# Patient Record
Sex: MALE | Race: WHITE | NOT HISPANIC OR LATINO
[De-identification: names, ages, dates, MRNs, and addresses within clinical notes are randomized per-mention and may not be internally consistent; named-entity substitution may affect disease eponyms.]

---

## 2021-02-11 ENCOUNTER — APPOINTMENT (OUTPATIENT)
Dept: SURGERY | Facility: CLINIC | Age: 61
End: 2021-02-11
Payer: COMMERCIAL

## 2021-02-11 ENCOUNTER — LABORATORY RESULT (OUTPATIENT)
Age: 61
End: 2021-02-11

## 2021-02-11 VITALS
HEART RATE: 60 BPM | WEIGHT: 214 LBS | BODY MASS INDEX: 27.46 KG/M2 | DIASTOLIC BLOOD PRESSURE: 78 MMHG | SYSTOLIC BLOOD PRESSURE: 152 MMHG | HEIGHT: 74 IN

## 2021-02-11 DIAGNOSIS — Z78.9 OTHER SPECIFIED HEALTH STATUS: ICD-10-CM

## 2021-02-11 DIAGNOSIS — Z86.39 PERSONAL HISTORY OF OTHER ENDOCRINE, NUTRITIONAL AND METABOLIC DISEASE: ICD-10-CM

## 2021-02-11 DIAGNOSIS — Z87.891 PERSONAL HISTORY OF NICOTINE DEPENDENCE: ICD-10-CM

## 2021-02-11 DIAGNOSIS — Z86.79 PERSONAL HISTORY OF OTHER DISEASES OF THE CIRCULATORY SYSTEM: ICD-10-CM

## 2021-02-11 PROCEDURE — 99072 ADDL SUPL MATRL&STAF TM PHE: CPT

## 2021-02-11 PROCEDURE — 10160 PNXR ASPIR ABSC HMTMA BULLA: CPT

## 2021-02-11 PROCEDURE — 99203 OFFICE O/P NEW LOW 30 MIN: CPT | Mod: 25

## 2021-02-11 RX ORDER — TRICON 15; 110; 75; .5 UG/1; MG/1; MG/1; MG/1
CAPSULE ORAL
Refills: 0 | Status: ACTIVE | COMMUNITY

## 2021-02-11 RX ORDER — METHYLPHENIDATE HYDROCHLORIDE 5 MG/1
TABLET ORAL
Refills: 0 | Status: ACTIVE | COMMUNITY

## 2021-02-11 RX ORDER — ATORVASTATIN CALCIUM 80 MG/1
TABLET, FILM COATED ORAL
Refills: 0 | Status: ACTIVE | COMMUNITY

## 2021-02-11 RX ORDER — LORAZEPAM 2 MG/1
TABLET ORAL
Refills: 0 | Status: ACTIVE | COMMUNITY

## 2021-02-14 NOTE — PROCEDURE
[FreeTextEntry1] : abscess aspiration [FreeTextEntry2] : inflamed mid back mass 5 cm [FreeTextEntry3] : Patient for abscess aspiration. Risks benefits and alternatives discussed including bleeding, infection, swelling and need for repeat biopsy. Questions answered.\par Consent obtained, timeout taken.\par \par Patient seated.\par No anesthetic used. \par Nodule localized by palpation \par Sterile technique with Betadine skin prep.\par 18-gauge needle with a single pass.\par Purulent fluid aspirated, culture sent\par \par Post procedure:\par Hemostasis obtained, patient tolerated well, no complications.\par Post procedure instructions given.\par

## 2021-02-16 NOTE — ASSESSMENT
[FreeTextEntry1] : Patient with sebaceous cysts of the back with recent inflammation. I performed aspiration of the lower mass for culture and started him on antibiotics. I will reevaluate him in 2 weeks to plan definitive surgical excision. The patient was instructed to contact my office if his symptoms worsen. I recommended warm compresses.

## 2021-02-16 NOTE — PHYSICAL EXAM
[Normal] : no neck adenopathy [de-identified] : no cervical or supraclavicular adenopathy, trachea midline, thyroid without enlargement or palpable mass [de-identified] :  two mid upper back massess, superior ~4 cm without erythema, lower 5 cm with erythema and fluctuance,  Skin:  normal appearance.  no rash, nodules, vesicles, or erythema,\par Musculoskeletal:  full range of motion and no deformities appreciated\par Neurological:  grossly intact\par Psychiatric:  oriented to person, place and time with appropriate affect

## 2021-02-16 NOTE — REASON FOR VISIT
[Initial Consultation] : an initial consultation for [Procedure: _________] : a [unfilled] procedure visit [Other: _____] : [unfilled] [FreeTextEntry2] : back masses

## 2021-02-16 NOTE — HISTORY OF PRESENT ILLNESS
[de-identified] : Patient referred by Dr. Roberts for evaluation of enlarging, increasingly symptomatic posterior back masses.  Patient reports a long history of back masses, 2 weeks ago, lower mass increased in size and is now painful. Denies prior drainage.

## 2021-02-16 NOTE — HISTORY OF PRESENT ILLNESS
[de-identified] : Patient referred by Dr. Roberts for evaluation of enlarging, increasingly symptomatic posterior back masses.  Patient reports a long history of back masses, 2 weeks ago, lower mass increased in size and is now painful. Denies prior drainage.

## 2021-02-16 NOTE — PHYSICAL EXAM
[Normal] : no neck adenopathy [de-identified] : no cervical or supraclavicular adenopathy, trachea midline, thyroid without enlargement or palpable mass [de-identified] :  two mid upper back massess, superior ~4 cm without erythema, lower 5 cm with erythema and fluctuance,  Skin:  normal appearance.  no rash, nodules, vesicles, or erythema,\par Musculoskeletal:  full range of motion and no deformities appreciated\par Neurological:  grossly intact\par Psychiatric:  oriented to person, place and time with appropriate affect

## 2021-02-16 NOTE — CONSULT LETTER
[Dear  ___] : Dear  [unfilled], [Consult Letter:] : I had the pleasure of evaluating your patient, [unfilled]. [Please see my note below.] : Please see my note below. [Consult Closing:] : Thank you very much for allowing me to participate in the care of this patient.  If you have any questions, please do not hesitate to contact me. [DrRolf  ___] : Dr. RICK [FreeTextEntry2] : Dr. Ambar Roberts [FreeTextEntry3] : Sincerely yours,\par \par Sophia Hutchison MD, FACS\par Assistant Professor of Surgery\par [facility]

## 2021-02-16 NOTE — CONSULT LETTER
[Dear  ___] : Dear  [unfilled], [Consult Letter:] : I had the pleasure of evaluating your patient, [unfilled]. [Please see my note below.] : Please see my note below. [Consult Closing:] : Thank you very much for allowing me to participate in the care of this patient.  If you have any questions, please do not hesitate to contact me. [DrRolf  ___] : Dr. RICK [FreeTextEntry2] : Dr. Ambar Roberts [FreeTextEntry3] : Sincerely yours,\par \par Sophia Hutchison MD, FACS\par Assistant Professor of Surgery\par [facility]

## 2021-02-25 ENCOUNTER — APPOINTMENT (OUTPATIENT)
Dept: SURGERY | Facility: CLINIC | Age: 61
End: 2021-02-25
Payer: COMMERCIAL

## 2021-02-25 PROCEDURE — 99072 ADDL SUPL MATRL&STAF TM PHE: CPT

## 2021-02-25 PROCEDURE — 99213 OFFICE O/P EST LOW 20 MIN: CPT

## 2021-02-25 NOTE — PHYSICAL EXAM
[de-identified] : no cervical or supraclavicular adenopathy, trachea midline, thyroid without enlargement or palpable mass [Normal] : no neck adenopathy [de-identified] :  two mid upper back masses, superior ~4 cm without erythema, lower 3 cm with significant improvement.   Skin:  normal appearance.  no rash, nodules, vesicles, or erythema,\par Musculoskeletal:  full range of motion and no deformities appreciated\par Neurological:  grossly intact\par Psychiatric:  oriented to person, place and time with appropriate affect

## 2021-02-25 NOTE — ASSESSMENT
[FreeTextEntry1] : Patient with sebaceous cysts of the back with recent inflammation.  keflex,  will schedule procedure. risks reviewed questions answered.

## 2021-02-25 NOTE — HISTORY OF PRESENT ILLNESS
[de-identified] : Patient referred by Dr. Roberts for evaluation of enlarging, increasingly symptomatic posterior back masses.  Patient reports a long history of back masses, 2 weeks ago, lower mass increased in size and is now painful. Denies prior drainage.  completed antibiotic with decrease in lower previously infected lesion, reports some continued drainage.

## 2021-03-16 ENCOUNTER — OUTPATIENT (OUTPATIENT)
Dept: OUTPATIENT SERVICES | Facility: HOSPITAL | Age: 61
LOS: 1 days | End: 2021-03-16
Payer: COMMERCIAL

## 2021-03-16 VITALS
WEIGHT: 212.08 LBS | HEIGHT: 71.5 IN | RESPIRATION RATE: 20 BRPM | HEART RATE: 58 BPM | TEMPERATURE: 97 F | SYSTOLIC BLOOD PRESSURE: 130 MMHG | OXYGEN SATURATION: 98 % | DIASTOLIC BLOOD PRESSURE: 70 MMHG

## 2021-03-16 DIAGNOSIS — Z98.890 OTHER SPECIFIED POSTPROCEDURAL STATES: Chronic | ICD-10-CM

## 2021-03-16 DIAGNOSIS — L72.3 SEBACEOUS CYST: ICD-10-CM

## 2021-03-16 DIAGNOSIS — Z01.812 ENCOUNTER FOR PREPROCEDURAL LABORATORY EXAMINATION: ICD-10-CM

## 2021-03-16 DIAGNOSIS — I73.9 PERIPHERAL VASCULAR DISEASE, UNSPECIFIED: ICD-10-CM

## 2021-03-16 LAB
ANION GAP SERPL CALC-SCNC: 13 MMOL/L — SIGNIFICANT CHANGE UP (ref 7–14)
BUN SERPL-MCNC: 17 MG/DL — SIGNIFICANT CHANGE UP (ref 7–23)
CALCIUM SERPL-MCNC: 10.2 MG/DL — SIGNIFICANT CHANGE UP (ref 8.4–10.5)
CHLORIDE SERPL-SCNC: 103 MMOL/L — SIGNIFICANT CHANGE UP (ref 98–107)
CO2 SERPL-SCNC: 23 MMOL/L — SIGNIFICANT CHANGE UP (ref 22–31)
CREAT SERPL-MCNC: 0.61 MG/DL — SIGNIFICANT CHANGE UP (ref 0.5–1.3)
GLUCOSE SERPL-MCNC: 108 MG/DL — HIGH (ref 70–99)
HCT VFR BLD CALC: 43.1 % — SIGNIFICANT CHANGE UP (ref 39–50)
HGB BLD-MCNC: 14.5 G/DL — SIGNIFICANT CHANGE UP (ref 13–17)
MCHC RBC-ENTMCNC: 31.7 PG — SIGNIFICANT CHANGE UP (ref 27–34)
MCHC RBC-ENTMCNC: 33.6 GM/DL — SIGNIFICANT CHANGE UP (ref 32–36)
MCV RBC AUTO: 94.1 FL — SIGNIFICANT CHANGE UP (ref 80–100)
NRBC # BLD: 0 /100 WBCS — SIGNIFICANT CHANGE UP
NRBC # FLD: 0 K/UL — SIGNIFICANT CHANGE UP
PLATELET # BLD AUTO: 185 K/UL — SIGNIFICANT CHANGE UP (ref 150–400)
POTASSIUM SERPL-MCNC: 4 MMOL/L — SIGNIFICANT CHANGE UP (ref 3.5–5.3)
POTASSIUM SERPL-SCNC: 4 MMOL/L — SIGNIFICANT CHANGE UP (ref 3.5–5.3)
RBC # BLD: 4.58 M/UL — SIGNIFICANT CHANGE UP (ref 4.2–5.8)
RBC # FLD: 13 % — SIGNIFICANT CHANGE UP (ref 10.3–14.5)
SODIUM SERPL-SCNC: 139 MMOL/L — SIGNIFICANT CHANGE UP (ref 135–145)
WBC # BLD: 7.15 K/UL — SIGNIFICANT CHANGE UP (ref 3.8–10.5)
WBC # FLD AUTO: 7.15 K/UL — SIGNIFICANT CHANGE UP (ref 3.8–10.5)

## 2021-03-16 PROCEDURE — 93010 ELECTROCARDIOGRAM REPORT: CPT

## 2021-03-16 RX ORDER — SODIUM CHLORIDE 9 MG/ML
1000 INJECTION, SOLUTION INTRAVENOUS
Refills: 0 | Status: DISCONTINUED | OUTPATIENT
Start: 2021-03-26 | End: 2021-03-31

## 2021-03-16 NOTE — H&P PST ADULT - NSICDXPASTMEDICALHX_GEN_ALL_CORE_FT
PAST MEDICAL HISTORY:  Attention deficit disorder (ADD)     Dyslipidemia     H/O carotid stenosis     PVD (peripheral vascular disease)     Sebaceous cyst

## 2021-03-16 NOTE — H&P PST ADULT - NSANTHOSAYNRD_GEN_A_CORE
No. SAMI screening performed.  STOP BANG Legend: 0-2 = LOW Risk; 3-4 = INTERMEDIATE Risk; 5-8 = HIGH Risk

## 2021-03-16 NOTE — H&P PST ADULT - NSICDXPROBLEM_GEN_ALL_CORE_FT
PROBLEM DIAGNOSES  Problem: PVD (peripheral vascular disease)  Assessment and Plan: Pt on plavix, instructed to stop 5 days prior to surgery and to concirm instructions with PCP - Dr Vázquez.  Pt verbalized understanding    Problem: Sebaceous cyst  Assessment and Plan: Scheduled for excision back mass X 2 on 3/26/2021  Written & verbal preop instructions, gi prophylaxis & surgical soap given  Pt verbalized good understanding.  Teach back done on surgical soap instructions.      Problem: Encounter for preprocedure screening laboratory testing for COVID-19  Assessment and Plan: per pt scheduled within 72 hrs of this surgery        PROBLEM DIAGNOSES  Problem: PVD (peripheral vascular disease)  Assessment and Plan: Pt on plavix, instructed to stop 5 days prior to surgery and to concirm instructions with PCP - Dr Vázquez.  Pending most recent echo/stress test & comparison EKG  Pt verbalized understanding    Problem: Sebaceous cyst  Assessment and Plan: Scheduled for excision back mass X 2 on 3/26/2021  Written & verbal preop instructions, gi prophylaxis & surgical soap given  Pt verbalized good understanding.  Teach back done on surgical soap instructions.  SAMI precautions recommended.  OR booking notified via fax.      Problem: Encounter for preprocedure screening laboratory testing for COVID-19  Assessment and Plan: per pt scheduled within 72 hrs of this surgery

## 2021-03-16 NOTE — H&P PST ADULT - HISTORY OF PRESENT ILLNESS
59y/o male presents for preop eval for scheduled excision of back mass X2.  Pt states had cysts for several years but one was recently infected.

## 2021-03-23 ENCOUNTER — APPOINTMENT (OUTPATIENT)
Dept: DISASTER EMERGENCY | Facility: CLINIC | Age: 61
End: 2021-03-23

## 2021-03-24 LAB — SARS-COV-2 N GENE NPH QL NAA+PROBE: NOT DETECTED

## 2021-03-25 ENCOUNTER — TRANSCRIPTION ENCOUNTER (OUTPATIENT)
Age: 61
End: 2021-03-25

## 2021-03-25 NOTE — ASU PATIENT PROFILE, ADULT - PMH
Attention deficit disorder (ADD)    Dyslipidemia    H/O carotid stenosis    PVD (peripheral vascular disease)    Sebaceous cyst

## 2021-03-26 ENCOUNTER — APPOINTMENT (OUTPATIENT)
Dept: SURGERY | Facility: HOSPITAL | Age: 61
End: 2021-03-26

## 2021-03-26 ENCOUNTER — OUTPATIENT (OUTPATIENT)
Dept: OUTPATIENT SERVICES | Facility: HOSPITAL | Age: 61
LOS: 1 days | Discharge: HOME | End: 2021-03-26
Payer: COMMERCIAL

## 2021-03-26 ENCOUNTER — RESULT REVIEW (OUTPATIENT)
Age: 61
End: 2021-03-26

## 2021-03-26 VITALS
HEIGHT: 71.5 IN | WEIGHT: 212.08 LBS | TEMPERATURE: 98 F | DIASTOLIC BLOOD PRESSURE: 71 MMHG | RESPIRATION RATE: 16 BRPM | SYSTOLIC BLOOD PRESSURE: 151 MMHG | OXYGEN SATURATION: 96 % | HEART RATE: 54 BPM

## 2021-03-26 VITALS
SYSTOLIC BLOOD PRESSURE: 128 MMHG | DIASTOLIC BLOOD PRESSURE: 88 MMHG | OXYGEN SATURATION: 98 % | RESPIRATION RATE: 16 BRPM | HEART RATE: 70 BPM

## 2021-03-26 DIAGNOSIS — L72.3 SEBACEOUS CYST: ICD-10-CM

## 2021-03-26 DIAGNOSIS — Z98.890 OTHER SPECIFIED POSTPROCEDURAL STATES: Chronic | ICD-10-CM

## 2021-03-26 PROCEDURE — 11403 EXC TR-EXT B9+MARG 2.1-3CM: CPT | Mod: 59

## 2021-03-26 PROCEDURE — 88305 TISSUE EXAM BY PATHOLOGIST: CPT | Mod: 26

## 2021-03-26 PROCEDURE — 11404 EXC TR-EXT B9+MARG 3.1-4 CM: CPT

## 2021-03-26 RX ORDER — OXYCODONE HYDROCHLORIDE 5 MG/1
5 TABLET ORAL ONCE
Refills: 0 | Status: DISCONTINUED | OUTPATIENT
Start: 2021-03-26 | End: 2021-03-26

## 2021-03-26 RX ORDER — ERGOCALCIFEROL 1.25 MG/1
1 CAPSULE ORAL
Qty: 0 | Refills: 0 | DISCHARGE

## 2021-03-26 RX ORDER — ACETAMINOPHEN 500 MG
650 TABLET ORAL EVERY 6 HOURS
Refills: 0 | Status: DISCONTINUED | OUTPATIENT
Start: 2021-03-26 | End: 2021-04-09

## 2021-03-26 RX ORDER — CLOPIDOGREL BISULFATE 75 MG/1
1 TABLET, FILM COATED ORAL
Qty: 0 | Refills: 0 | DISCHARGE

## 2021-03-26 RX ORDER — ONDANSETRON 8 MG/1
4 TABLET, FILM COATED ORAL ONCE
Refills: 0 | Status: DISCONTINUED | OUTPATIENT
Start: 2021-03-26 | End: 2021-04-09

## 2021-03-26 RX ORDER — FENTANYL CITRATE 50 UG/ML
25 INJECTION INTRAVENOUS
Refills: 0 | Status: DISCONTINUED | OUTPATIENT
Start: 2021-03-26 | End: 2021-03-26

## 2021-03-26 RX ORDER — FOLIC ACID 0.8 MG
1 TABLET ORAL
Qty: 0 | Refills: 0 | DISCHARGE

## 2021-03-26 RX ORDER — FENOFIBRATE,MICRONIZED 130 MG
1 CAPSULE ORAL
Qty: 0 | Refills: 0 | DISCHARGE

## 2021-03-26 RX ORDER — METHYLPHENIDATE HCL 5 MG
1 TABLET ORAL
Qty: 0 | Refills: 0 | DISCHARGE

## 2021-03-26 RX ORDER — ATORVASTATIN CALCIUM 80 MG/1
1 TABLET, FILM COATED ORAL
Qty: 0 | Refills: 0 | DISCHARGE

## 2021-03-26 RX ORDER — ACETAMINOPHEN 500 MG
2 TABLET ORAL
Qty: 0 | Refills: 0 | DISCHARGE
Start: 2021-03-26

## 2021-03-26 NOTE — ASU DISCHARGE PLAN (ADULT/PEDIATRIC) - ASU DC SPECIAL INSTRUCTIONSFT
Keep dressings dry for 48 hours, then may shower with steri strips.   Avoid NSAIDs (Advil, Motrin, Ibuprofen) for 48 hours.   Take OTC Tylenol 325mg, 2 tabs every 6 hours as needed for pain.   *Follow up with Dr. Hutchison on (4/6/21), please call the office for an appointment.

## 2021-03-26 NOTE — ASU DISCHARGE PLAN (ADULT/PEDIATRIC) - CARE PROVIDER_API CALL
Sophia Hutchison (MD)  Surgery  [address], [address]  [place] [address]  Phone: [phone]  Fax: [phone]  Scheduled Appointment: 04/06/2021

## 2021-03-29 PROBLEM — F98.8 OTHER SPECIFIED BEHAVIORAL AND EMOTIONAL DISORDERS WITH ONSET USUALLY OCCURRING IN CHILDHOOD AND ADOLESCENCE: Chronic | Status: ACTIVE | Noted: 2021-03-16

## 2021-03-29 PROBLEM — L72.3 SEBACEOUS CYST: Chronic | Status: ACTIVE | Noted: 2021-03-16

## 2021-03-29 PROBLEM — E78.5 HYPERLIPIDEMIA, UNSPECIFIED: Chronic | Status: ACTIVE | Noted: 2021-03-16

## 2021-03-29 PROBLEM — Z86.79 PERSONAL HISTORY OF OTHER DISEASES OF THE CIRCULATORY SYSTEM: Chronic | Status: ACTIVE | Noted: 2021-03-16

## 2021-03-29 PROBLEM — I73.9 PERIPHERAL VASCULAR DISEASE, UNSPECIFIED: Chronic | Status: ACTIVE | Noted: 2021-03-16

## 2021-04-01 LAB — SURGICAL PATHOLOGY STUDY: SIGNIFICANT CHANGE UP

## 2021-04-08 ENCOUNTER — APPOINTMENT (OUTPATIENT)
Dept: SURGERY | Facility: CLINIC | Age: 61
End: 2021-04-08
Payer: COMMERCIAL

## 2021-04-08 PROCEDURE — 99072 ADDL SUPL MATRL&STAF TM PHE: CPT

## 2021-04-08 PROCEDURE — 99212 OFFICE O/P EST SF 10 MIN: CPT

## 2021-04-08 NOTE — HISTORY OF PRESENT ILLNESS
[de-identified] : Patient referred by Dr. Roberts for evaluation of enlarging, increasingly symptomatic posterior back masses.  Patient reports a long history of back masses, 2 weeks ago, lower mass increased in size and is now painful. Denies prior drainage.  completed antibiotic with decrease in lower previously infected lesion, reports some continued drainage.  \par 3/26/21 excision back masses x 2.  path benign, denies pain or drainage.

## 2021-04-08 NOTE — PHYSICAL EXAM
[de-identified] : no cervical or supraclavicular adenopathy, trachea midline, thyroid without enlargement or palpable mass [Normal] : no neck adenopathy [de-identified] :  two  back incisions healing. scar min discussed. .   Skin:  normal appearance.  no rash, nodules, vesicles, or erythema,\par Musculoskeletal:  full range of motion and no deformities appreciated\par Neurological:  grossly intact\par Psychiatric:  oriented to person, place and time with appropriate affect

## 2021-04-08 NOTE — ASSESSMENT
[FreeTextEntry1] : Patient with sebaceous cysts of the back with recent inflammation. Doing well postop  sutures removed. . rto 4 mo.

## 2021-04-14 ENCOUNTER — TRANSCRIPTION ENCOUNTER (OUTPATIENT)
Age: 61
End: 2021-04-14

## 2021-04-15 ENCOUNTER — APPOINTMENT (OUTPATIENT)
Dept: SURGERY | Facility: CLINIC | Age: 61
End: 2021-04-15
Payer: COMMERCIAL

## 2021-04-15 DIAGNOSIS — Z01.818 ENCOUNTER FOR OTHER PREPROCEDURAL EXAMINATION: ICD-10-CM

## 2021-04-15 DIAGNOSIS — T81.30XA DISRUPTION OF WOUND, UNSPECIFIED, INITIAL ENCOUNTER: ICD-10-CM

## 2021-04-15 PROCEDURE — 99072 ADDL SUPL MATRL&STAF TM PHE: CPT

## 2021-04-15 PROCEDURE — 99212 OFFICE O/P EST SF 10 MIN: CPT

## 2021-04-15 NOTE — HISTORY OF PRESENT ILLNESS
[de-identified] : Patient referred by Dr. Roberts for evaluation of enlarging, increasingly symptomatic posterior back masses.  Patient reports a long history of back masses, 2 weeks ago, lower mass increased in size and is now painful. Denies prior drainage.  completed antibiotic with decrease in lower previously infected lesion, reports some continued drainage.  \par 3/26/21 excision back masses x 2.  path benign, denies pain or drainage.  I spoke to the patient yesterday and reported drainage and bleeding from upper back wound. Patient was seen in urgent care center, diagnosed with mild dehiscence of the wound. Started on Bactrim. Denies additional bloody drainage. Patient has stopped his Plavix in preparation for dental procedure. Denies fever, chills, sweats, or other symptoms.

## 2021-04-15 NOTE — PHYSICAL EXAM
[de-identified] : no cervical or supraclavicular adenopathy, trachea midline, thyroid without enlargement or palpable mass [Normal] : no neck adenopathy [de-identified] :  upper  back incisions with area of dehiscence.  minimal erythema,  scar min discussed. .   Skin:  normal appearance.  no rash, nodules, vesicles, or erythema,\par Musculoskeletal:  full range of motion and no deformities appreciated\par Neurological:  grossly intact\par Psychiatric:  oriented to person, place and time with appropriate affect

## 2021-04-15 NOTE — ASSESSMENT
[FreeTextEntry1] : Patient with sebaceous cysts of the back with slight dehiscence, will complet antibiotics. RTO 2 weeks.

## 2021-04-27 ENCOUNTER — APPOINTMENT (OUTPATIENT)
Dept: SURGERY | Facility: CLINIC | Age: 61
End: 2021-04-27
Payer: COMMERCIAL

## 2021-04-27 DIAGNOSIS — R22.2 LOCALIZED SWELLING, MASS AND LUMP, TRUNK: ICD-10-CM

## 2021-04-27 PROCEDURE — 99072 ADDL SUPL MATRL&STAF TM PHE: CPT

## 2021-04-27 PROCEDURE — 99212 OFFICE O/P EST SF 10 MIN: CPT

## 2021-04-27 NOTE — PHYSICAL EXAM
[de-identified] : no cervical or supraclavicular adenopathy, trachea midline, thyroid without enlargement or palpable mass [Normal] : no neck adenopathy [de-identified] :  upper  back incisions with area of dehiscence decreasing. no  erythema,  scar min discussed. .   Skin:  normal appearance.  no rash, nodules, vesicles, or erythema,\par Musculoskeletal:  full range of motion and no deformities appreciated\par Neurological:  grossly intact\par Psychiatric:  oriented to person, place and time with appropriate affect

## 2021-04-27 NOTE — ASSESSMENT
[FreeTextEntry1] : Patient with sebaceous cysts of the back with slight dehiscence, continue wound care  RTO 4 mo

## 2021-04-27 NOTE — HISTORY OF PRESENT ILLNESS
Patient called the after hour line because he is nauseated. He states he is keeping food down but sudden movements makes him nauseated and he has had dry heaves. He currently denies any vertigo, he has had that before with a BP medication. He denies any fever and his bowels are moving well. I ordered zofran #20 which he states he has taken before and it has worked well for him in the past. His pharmacy is [facility] [address].
[de-identified] : Patient referred by Dr. Roberts for evaluation of enlarging, increasingly symptomatic posterior back masses.  Patient reports a long history of back masses, 2 weeks ago, lower mass increased in size and is now painful. Denies prior drainage.  completed antibiotic with decrease in lower previously infected lesion, reports some continued drainage.  \par 3/26/21 excision back masses x 2.  path benign, denies pain or drainage.  I spoke to the patient yesterday and reported drainage and bleeding from upper back wound. Patient was seen in urgent care center, diagnosed with mild dehiscence of the wound. Started on Bactrim. Denies additional bloody drainage. Patient has stopped his Plavix in preparation for dental procedure. Denies fever, chills, sweats, or other symptoms.

## 2021-06-15 ENCOUNTER — APPOINTMENT (OUTPATIENT)
Dept: GASTROENTEROLOGY | Facility: CLINIC | Age: 61
End: 2021-06-15
Payer: COMMERCIAL

## 2021-06-15 VITALS
OXYGEN SATURATION: 94 % | SYSTOLIC BLOOD PRESSURE: 139 MMHG | HEART RATE: 71 BPM | WEIGHT: 210 LBS | DIASTOLIC BLOOD PRESSURE: 82 MMHG | HEIGHT: 74 IN | BODY MASS INDEX: 26.95 KG/M2 | TEMPERATURE: 97.8 F

## 2021-06-15 DIAGNOSIS — I70.209 UNSPECIFIED ATHEROSCLEROSIS OF NATIVE ARTERIES OF EXTREMITIES, UNSPECIFIED EXTREMITY: ICD-10-CM

## 2021-06-15 DIAGNOSIS — Z12.11 ENCOUNTER FOR SCREENING FOR MALIGNANT NEOPLASM OF COLON: ICD-10-CM

## 2021-06-15 DIAGNOSIS — Z82.49 FAMILY HISTORY OF ISCHEMIC HEART DISEASE AND OTHER DISEASES OF THE CIRCULATORY SYSTEM: ICD-10-CM

## 2021-06-15 DIAGNOSIS — Z80.1 FAMILY HISTORY OF MALIGNANT NEOPLASM OF TRACHEA, BRONCHUS AND LUNG: ICD-10-CM

## 2021-06-15 DIAGNOSIS — Z23 ENCOUNTER FOR IMMUNIZATION: ICD-10-CM

## 2021-06-15 PROCEDURE — 99204 OFFICE O/P NEW MOD 45 MIN: CPT

## 2021-06-15 RX ORDER — CALCIUM CARBONATE/VITAMIN D3 600 MG-10
TABLET ORAL
Refills: 0 | Status: ACTIVE | COMMUNITY

## 2021-06-15 RX ORDER — OMEPRAZOLE 20 MG/1
20 CAPSULE, DELAYED RELEASE ORAL
Refills: 0 | Status: ACTIVE | COMMUNITY

## 2021-06-15 RX ORDER — SULFAMETHOXAZOLE AND TRIMETHOPRIM 400; 80 MG/1; MG/1
TABLET ORAL
Refills: 0 | Status: DISCONTINUED | COMMUNITY
End: 2021-06-15

## 2021-06-15 RX ORDER — CLINDAMYCIN HYDROCHLORIDE 300 MG/1
300 CAPSULE ORAL EVERY 6 HOURS
Qty: 40 | Refills: 0 | Status: DISCONTINUED | COMMUNITY
Start: 2021-02-25 | End: 2021-06-15

## 2021-06-15 RX ORDER — FENOFIBRATE 145 MG/1
145 TABLET ORAL
Refills: 0 | Status: ACTIVE | COMMUNITY

## 2021-06-15 RX ORDER — CLINDAMYCIN HYDROCHLORIDE 300 MG/1
300 CAPSULE ORAL EVERY 6 HOURS
Qty: 28 | Refills: 0 | Status: DISCONTINUED | COMMUNITY
Start: 2021-02-11 | End: 2021-06-15

## 2021-06-15 RX ORDER — POLYETHYLENE GLYCOL 3350, SODIUM CHLORIDE, SODIUM BICARBONATE AND POTASSIUM CHLORIDE WITH LEMON FLAVOR 420; 11.2; 5.72; 1.48 G/4L; G/4L; G/4L; G/4L
420 POWDER, FOR SOLUTION ORAL
Qty: 1 | Refills: 0 | Status: ACTIVE | COMMUNITY
Start: 2021-06-15 | End: 1900-01-01

## 2021-06-15 RX ORDER — CLOPIDOGREL BISULFATE 75 MG/1
75 TABLET, FILM COATED ORAL
Refills: 0 | Status: ACTIVE | COMMUNITY

## 2021-06-15 RX ORDER — SODIUM SULFATE, POTASSIUM SULFATE, MAGNESIUM SULFATE 17.5; 3.13; 1.6 G/ML; G/ML; G/ML
17.5-3.13-1.6 SOLUTION, CONCENTRATE ORAL
Qty: 1 | Refills: 0 | Status: DISCONTINUED | COMMUNITY
Start: 2021-06-15 | End: 2021-06-15

## 2021-06-15 NOTE — PHYSICAL EXAM
[General Appearance - Alert] : alert [General Appearance - In No Acute Distress] : in no acute distress [Sclera] : the sclera and conjunctiva were normal [PERRL With Normal Accommodation] : pupils were equal in size, round, and reactive to light [Extraocular Movements] : extraocular movements were intact [Outer Ear] : the ears and nose were normal in appearance [Neck Appearance] : the appearance of the neck was normal [Neck Cervical Mass (___cm)] : no neck mass was observed [Jugular Venous Distention Increased] : there was no jugular-venous distention [Thyroid Diffuse Enlargement] : the thyroid was not enlarged [Thyroid Nodule] : there were no palpable thyroid nodules [Auscultation Breath Sounds / Voice Sounds] : lungs were clear to auscultation bilaterally [Heart Rate And Rhythm] : heart rate was normal and rhythm regular [Heart Sounds] : normal S1 and S2 [Heart Sounds Gallop] : no gallops [Murmurs] : no murmurs [Heart Sounds Pericardial Friction Rub] : no pericardial rub [Bowel Sounds] : normal bowel sounds [Abdomen Soft] : soft [Abdomen Tenderness] : non-tender [] : no hepato-splenomegaly [Abdomen Mass (___ Cm)] : no abdominal mass palpated [Cervical Lymph Nodes Enlarged Posterior Bilaterally] : posterior cervical [Cervical Lymph Nodes Enlarged Anterior Bilaterally] : anterior cervical [Supraclavicular Lymph Nodes Enlarged Bilaterally] : supraclavicular [Axillary Lymph Nodes Enlarged Bilaterally] : axillary [No CVA Tenderness] : no ~M costovertebral angle tenderness [No Spinal Tenderness] : no spinal tenderness [Abnormal Walk] : normal gait [Nail Clubbing] : no clubbing  or cyanosis of the fingernails [Musculoskeletal - Swelling] : no joint swelling seen [Motor Tone] : muscle strength and tone were normal [Deep Tendon Reflexes (DTR)] : deep tendon reflexes were 2+ and symmetric [Sensation] : the sensory exam was normal to light touch and pinprick [No Focal Deficits] : no focal deficits [Oriented To Time, Place, And Person] : oriented to person, place, and time [Impaired Insight] : insight and judgment were intact [Affect] : the affect was normal

## 2021-06-15 NOTE — ASSESSMENT
[FreeTextEntry1] : screening colonoscopy- Previous colon polyps makes him a high risk candidate forr screening colonoscopy. Colon cancer screening and colonoscopy prep discussed with the patient.\par Peripheral vascular disease- Patient needs medical clearance and advisement regarding holding his Plavix for the colonoscopy.

## 2021-06-15 NOTE — CONSULT LETTER
[Dear  ___] : Dear  [unfilled], [Consult Letter:] : I had the pleasure of evaluating your patient, [unfilled]. [Please see my note below.] : Please see my note below. [Consult Closing:] : Thank you very much for allowing me to participate in the care of this patient.  If you have any questions, please do not hesitate to contact me. [Sincerely,] : Sincerely, [FreeTextEntry3] : Hermes Cui MD, FACG\par

## 2021-08-03 ENCOUNTER — APPOINTMENT (OUTPATIENT)
Dept: SURGERY | Facility: CLINIC | Age: 61
End: 2021-08-03

## 2021-09-29 NOTE — H&P PST ADULT - BP NONINVASIVE MEAN (MM HG)
[FreeTextEntry1] : Sleep study (7/14/2021) revealed sleep apnea with an AHI/DEBI of 11.5, and a low oxygen saturation of 89%\par  \par \par -Images and procedures reviewed in detail and discussed with patient. 
90

## 2021-11-03 DIAGNOSIS — Z20.822 CONTACT WITH AND (SUSPECTED) EXPOSURE TO COVID-19: ICD-10-CM

## 2021-11-06 ENCOUNTER — TRANSCRIPTION ENCOUNTER (OUTPATIENT)
Age: 61
End: 2021-11-06

## 2021-11-10 ENCOUNTER — APPOINTMENT (OUTPATIENT)
Dept: GASTROENTEROLOGY | Facility: AMBULATORY MEDICAL SERVICES | Age: 61
End: 2021-11-10
Payer: COMMERCIAL

## 2021-11-10 PROCEDURE — 45380 COLONOSCOPY AND BIOPSY: CPT

## 2021-12-07 ENCOUNTER — APPOINTMENT (OUTPATIENT)
Dept: GASTROENTEROLOGY | Facility: CLINIC | Age: 61
End: 2021-12-07
Payer: COMMERCIAL

## 2021-12-07 VITALS
TEMPERATURE: 97.3 F | BODY MASS INDEX: 27.98 KG/M2 | OXYGEN SATURATION: 96 % | HEART RATE: 65 BPM | HEIGHT: 74 IN | WEIGHT: 218 LBS | SYSTOLIC BLOOD PRESSURE: 131 MMHG | DIASTOLIC BLOOD PRESSURE: 78 MMHG

## 2021-12-07 DIAGNOSIS — K63.5 POLYP OF COLON: ICD-10-CM

## 2021-12-07 PROCEDURE — 99213 OFFICE O/P EST LOW 20 MIN: CPT

## 2021-12-07 NOTE — ASSESSMENT
[FreeTextEntry1] : Colon polyps.  Colonoscopy performed with poor visualization of the entire colon.  2 small polyps were removed.  He will require a surveillance colonoscopy in approximately 1 year.

## 2021-12-07 NOTE — HISTORY OF PRESENT ILLNESS
[de-identified] : 60-year-old man with colon polyps.  He underwent a colonoscopy on 11/10/21 that showed 2 polyps in the descending colon and rectum.  Polyp in the descending colon was hyperplastic whereas polyp in the rectum was tubular adenoma.  Prep was fair with poor visualization of the ascending and descending colon.

## 2022-12-02 ENCOUNTER — NON-APPOINTMENT (OUTPATIENT)
Age: 62
End: 2022-12-02